# Patient Record
Sex: MALE | Race: AMERICAN INDIAN OR ALASKA NATIVE
[De-identification: names, ages, dates, MRNs, and addresses within clinical notes are randomized per-mention and may not be internally consistent; named-entity substitution may affect disease eponyms.]

---

## 2019-01-16 ENCOUNTER — HOSPITAL ENCOUNTER (EMERGENCY)
Dept: HOSPITAL 5 - ED | Age: 24
Discharge: HOME | End: 2019-01-16
Payer: SELF-PAY

## 2019-01-16 PROCEDURE — 99281 EMR DPT VST MAYX REQ PHY/QHP: CPT

## 2019-01-16 NOTE — EMERGENCY DEPARTMENT REPORT
ED ENT HPI





- General


Chief complaint: Dental/Oral


Stated complaint: LFT SIDE TOOTH PAIN


Time Seen by Provider: 01/16/19 18:45


Source: patient


Mode of arrival: Ambulatory


Limitations: No Limitations





- History of Present Illness


Initial comments: 





This is a 23-year-old male nontoxic, well nourished in appearance, no acute 

signs of distress presents to the ED with c/o of left upper toothache 2 years. 

Patient denies following up with a dentist.  Patient stated that pain radiates 

from his job to his left side of head.  Patient otherwise denies any head 

trauma.  Patient describes toothache as aching level of 8 out of 10.  Patient 

denies any facial swelling.  Patient denies any numbness, tingling, fever, 

chills, headache, stiff neck, abdominal pain, chest pain, shortness of breath.  

Patient denies any drug allergies or significant past medical history.  


MD complaint: tooth pain


-: year(s)


Location: tooth #





                            __________________________














                            __________________________





 1 - pain here





Severity: mild


Severity scale (0 -10): 8


Quality: aching


Consistency: constant


Improves with: none


Worsens with: none


Context- Dental: history of dental caries


Associated Symptoms: gum swelling, toothache.  denies: fever, cough, pain with 

swallowing, sore throat, tinnitus, hearing loss, discharge from ear, rhinorrhea





- Related Data


                                  Previous Rx's











 Medication  Instructions  Recorded  Last Taken  Type


 


Amoxicillin/K Clav Tab [Augmentin 1 tab PO Q12HR #20 tab 01/16/19 Unknown Rx





875 mg]    


 


Chlorhexidine Mouthwash [Peridex] 15 ml MM BID #1 bottle 01/16/19 Unknown Rx


 


Ibuprofen [Motrin] 600 mg PO Q8H PRN #20 tablet 01/16/19 Unknown Rx











                                    Allergies











Allergy/AdvReac Type Severity Reaction Status Date / Time


 


No Known Allergies Allergy   Unverified 01/16/19 17:36














ED Dental HPI





- General


Chief complaint: Dental/Oral


Stated complaint: LFT SIDE TOOTH PAIN


Time Seen by Provider: 01/16/19 18:45


Source: patient


Mode of arrival: Ambulatory


Limitations: No Limitations





- Related Data


                                  Previous Rx's











 Medication  Instructions  Recorded  Last Taken  Type


 


Amoxicillin/K Clav Tab [Augmentin 1 tab PO Q12HR #20 tab 01/16/19 Unknown Rx





875 mg]    


 


Chlorhexidine Mouthwash [Peridex] 15 ml MM BID #1 bottle 01/16/19 Unknown Rx


 


Ibuprofen [Motrin] 600 mg PO Q8H PRN #20 tablet 01/16/19 Unknown Rx











                                    Allergies











Allergy/AdvReac Type Severity Reaction Status Date / Time


 


No Known Allergies Allergy   Unverified 01/16/19 17:36














ED Review of Systems


ROS: 


Stated complaint: LFT SIDE TOOTH PAIN


Other details as noted in HPI





Constitutional: denies: chills, fever


Eyes: denies: eye pain, eye discharge, vision change


ENT: dental pain.  denies: ear pain, throat pain


Respiratory: denies: cough, shortness of breath, wheezing


Cardiovascular: denies: chest pain, palpitations


Endocrine: no symptoms reported


Gastrointestinal: denies: abdominal pain, nausea, diarrhea


Genitourinary: denies: urgency, dysuria


Musculoskeletal: denies: back pain, joint swelling, arthralgia


Skin: denies: rash, lesions


Neurological: denies: headache, weakness, paresthesias


Psychiatric: denies: anxiety, depression


Hematological/Lymphatic: denies: easy bleeding, easy bruising





ED Past Medical Hx





- Past Medical History


Previous Medical History?: No





- Surgical History


Past Surgical History?: No





- Social History


Smoking Status: Current Every Day Smoker


Substance Use Type: Alcohol





- Medications


Home Medications: 


                                Home Medications











 Medication  Instructions  Recorded  Confirmed  Last Taken  Type


 


Amoxicillin/K Clav Tab [Augmentin 1 tab PO Q12HR #20 tab 01/16/19  Unknown Rx





875 mg]     


 


Chlorhexidine Mouthwash [Peridex] 15 ml MM BID #1 bottle 01/16/19  Unknown Rx


 


Ibuprofen [Motrin] 600 mg PO Q8H PRN #20 tablet 01/16/19  Unknown Rx














ED Physical Exam





- General


Limitations: No Limitations


General appearance: alert, in no apparent distress





- Head


Head exam: Present: atraumatic, normocephalic





- Expanded ENT Exam


  ** Expanded


Ear exam: Present: normal external inspection


Mouth exam: Present: normal external inspection.  Absent: drooling, trismus, 

muffled voice


Teeth exam: Present: dental caries, fractured tooth #, dental tenderness #, 

gingival enlargement, other (no facial swelling)


Throat exam: Positive: normal inspection, other (uvula midline.).  Negative: 

tonsillar erythema, tonsillomegaly, tonsillar exudate, R peritonsillar mass, L 

peritonsillar mass





- Neck


Neck exam: Present: normal inspection, full ROM





- Extremities Exam


Extremities exam: Present: normal inspection, full ROM





- Back Exam


Back exam: Present: normal inspection, full ROM





- Neurological Exam


Neurological exam: Present: alert, oriented X3





- Psychiatric


Psychiatric exam: Present: normal affect, normal mood





- Skin


Skin exam: Present: warm, dry, intact, normal color.  Absent: rash





ED Course


                                   Vital Signs











  01/16/19





  17:35


 


Temperature 98.2 F


 


Pulse Rate 75


 


Respiratory 16





Rate 


 


Blood Pressure 139/84


 


O2 Sat by Pulse 98





Oximetry 














- Reevaluation(s)


Reevaluation #1: 





01/16/19 18:54


Patient is speaking in full sentences with no signs of distress noted.


Critical care attestation.: 


If time is entered above; I have spent that time in minutes in the direct care 

of this critically ill patient, excluding procedure time.








ED Disposition


Clinical Impression: 


 Dental caries, Gingivitis





Disposition: DC-01 TO HOME OR SELFCARE


Is pt being admited?: No


Does the pt Need Aspirin: No


Condition: Stable


Instructions:  Acetaminophen/Codeine (By mouth), Dental Caries (ED), Gingivitis 

(ED)


Additional Instructions: 


Follow-up with a dentist doctor in 3-5 days or if symptoms worsen and continue 

return to emergency room as soon as possible. 


Prescriptions: 


Amoxicillin/K Clav Tab [Augmentin 875 mg] 1 tab PO Q12HR #20 tab


Chlorhexidine Mouthwash [Peridex] 15 ml MM BID #1 bottle


Ibuprofen [Motrin] 600 mg PO Q8H PRN #20 tablet


 PRN Reason: Pain


Referrals: 


PRIMARY CARE,MD [Referring] - 3-5 Days


[facility] [Outside] - 3-5 Days


Forms:  Work/School Release Form(ED)

## 2021-05-28 ENCOUNTER — HOSPITAL ENCOUNTER (EMERGENCY)
Dept: HOSPITAL 5 - ED | Age: 26
Discharge: LEFT BEFORE DISCHARGE | End: 2021-05-28
Payer: SELF-PAY